# Patient Record
Sex: FEMALE | URBAN - METROPOLITAN AREA
[De-identification: names, ages, dates, MRNs, and addresses within clinical notes are randomized per-mention and may not be internally consistent; named-entity substitution may affect disease eponyms.]

---

## 2018-12-17 ENCOUNTER — OFFICE VISIT (OUTPATIENT)
Dept: URBAN - METROPOLITAN AREA CLINIC 11 | Facility: CLINIC | Age: 58
End: 2018-12-17
Payer: COMMERCIAL

## 2018-12-17 DIAGNOSIS — G35 MULTIPLE SCLEROSIS: ICD-10-CM

## 2018-12-17 PROCEDURE — 92014 COMPRE OPH EXAM EST PT 1/>: CPT | Performed by: OPTOMETRIST

## 2018-12-17 ASSESSMENT — KERATOMETRY
OS: 44.63
OD: 43.38

## 2018-12-17 ASSESSMENT — INTRAOCULAR PRESSURE
OD: 11
OS: 9

## 2018-12-17 NOTE — IMPRESSION/PLAN
Impression: Diagnosis: Multiple sclerosis. Code: G35. Plan: OCT of macula and optic nerve normal ou. Vision loss does not seem to be ocular. No Pathology seen today to explain rapid vision loss. Optic nerves look healthy today, no swelling seen.  Refer to [facility] for vision therapy eval.

## 2018-12-17 NOTE — IMPRESSION/PLAN
Impression: Age-related nuclear cataract, bilateral: H25.13. Plan: Discussed diagnosis in detail with patient. No treatment is required at this time. Will continue to observe condition and or symptoms. Call if [address] worsens.

## 2018-12-17 NOTE — IMPRESSION/PLAN
Impression: Type 2 diabetes mellitus w/o complication: C95.6. Plan: No signs of retinopathy or neovascularization noted. Discussed ocular and systemic benefits of blood sugar control.  RTC 1yr complete exam

## 2019-04-25 ENCOUNTER — OFFICE VISIT (OUTPATIENT)
Dept: URBAN - METROPOLITAN AREA CLINIC 11 | Facility: CLINIC | Age: 59
End: 2019-04-25
Payer: COMMERCIAL

## 2019-04-25 DIAGNOSIS — H00.022 HORDEOLUM INTERNUM RIGHT LOWER EYELID: Primary | ICD-10-CM

## 2019-04-25 PROCEDURE — 92012 INTRM OPH EXAM EST PATIENT: CPT | Performed by: OPTOMETRIST

## 2019-04-25 ASSESSMENT — INTRAOCULAR PRESSURE
OS: 9
OD: 9

## 2019-05-06 ENCOUNTER — OFFICE VISIT (OUTPATIENT)
Dept: URBAN - METROPOLITAN AREA CLINIC 11 | Facility: CLINIC | Age: 59
End: 2019-05-06
Payer: COMMERCIAL

## 2019-05-06 PROCEDURE — 99213 OFFICE O/P EST LOW 20 MIN: CPT | Performed by: OPTOMETRIST

## 2019-05-06 ASSESSMENT — INTRAOCULAR PRESSURE
OD: 11
OS: 11

## 2019-05-06 NOTE — IMPRESSION/PLAN
Impression: Hordeolum internum right lower eyelid: H00.022. Plan: Cont HC and LM BID x 10mins.  x 2wks

## 2020-02-18 ENCOUNTER — OFFICE VISIT (OUTPATIENT)
Dept: URBAN - METROPOLITAN AREA CLINIC 11 | Facility: CLINIC | Age: 60
End: 2020-02-18
Payer: COMMERCIAL

## 2020-02-18 DIAGNOSIS — H53.10 UNSPECIFIED SUBJECTIVE VISUAL DISTURBANCES: ICD-10-CM

## 2020-02-18 DIAGNOSIS — H25.13 AGE-RELATED NUCLEAR CATARACT, BILATERAL: Primary | ICD-10-CM

## 2020-02-18 PROCEDURE — 92014 COMPRE OPH EXAM EST PT 1/>: CPT | Performed by: OPTOMETRIST

## 2020-02-18 ASSESSMENT — INTRAOCULAR PRESSURE
OS: 11
OD: 10

## 2020-02-18 NOTE — IMPRESSION/PLAN
Impression: Diagnosis: Unspecified subjective visual disturbances. Code: H53.10.  Plan: Refer to [facility] for [facility] eval

## 2020-02-18 NOTE — IMPRESSION/PLAN
Impression: Diagnosis: Type 2 diabetes mellitus without complications. Code: E11.9. Plan: Discussed diagnosis in detail with patient.

## 2020-02-18 NOTE — IMPRESSION/PLAN
Impression: Diagnosis: Multiple sclerosis. Code: G35. Plan: No Pathology seen today to explain  vision loss. Optic nerves look healthy today, no swelling seen. Cataract are very early, do not recommend CEIOL at this time. Monitor.

## 2021-08-19 ENCOUNTER — OFFICE VISIT (OUTPATIENT)
Dept: URBAN - METROPOLITAN AREA CLINIC 11 | Facility: CLINIC | Age: 61
End: 2021-08-19
Payer: COMMERCIAL

## 2021-08-19 DIAGNOSIS — E11.9 TYPE 2 DIABETES MELLITUS WITHOUT COMPLICATIONS: Primary | ICD-10-CM

## 2021-08-19 PROCEDURE — 92014 COMPRE OPH EXAM EST PT 1/>: CPT | Performed by: OPTOMETRIST

## 2021-08-19 ASSESSMENT — INTRAOCULAR PRESSURE
OD: 10
OS: 10

## 2021-08-19 NOTE — IMPRESSION/PLAN
Impression: Diagnosis: Type 2 diabetes mellitus without complications. Code: E11.9. Plan: Fundus photos performed today ou. No DME. No signs of neovascularization noted. Discussed ocular and systemic benefits of blood sugar control. Continue blood sugar control.  RTC 1yr complete exam

## 2021-08-19 NOTE — IMPRESSION/PLAN
Impression: Diagnosis: Multiple sclerosis. Code: G35. Plan: Hx of head trauma which may contribute to decreased VA. No treatment is required at this time. Will continue to observe condition and or symptoms. Call if [address] worsens.

## 2024-07-18 ENCOUNTER — OFFICE VISIT (OUTPATIENT)
Dept: URBAN - METROPOLITAN AREA CLINIC 28 | Facility: CLINIC | Age: 64
End: 2024-07-18
Payer: COMMERCIAL

## 2024-07-18 DIAGNOSIS — R51.9 HEADACHE: ICD-10-CM

## 2024-07-18 DIAGNOSIS — H25.813 COMBINED FORMS OF AGE-RELATED CATARACT, BILATERAL: ICD-10-CM

## 2024-07-18 DIAGNOSIS — G35 MULTIPLE SCLEROSIS: ICD-10-CM

## 2024-07-18 DIAGNOSIS — E11.9 TYPE 2 DIABETES MELLITUS WITHOUT COMPLICATIONS: Primary | ICD-10-CM

## 2024-07-18 PROCEDURE — 92014 COMPRE OPH EXAM EST PT 1/>: CPT | Performed by: OPTOMETRIST

## 2024-07-18 PROCEDURE — 92250 FUNDUS PHOTOGRAPHY W/I&R: CPT | Performed by: OPTOMETRIST

## 2024-07-18 PROCEDURE — 92133 CPTRZD OPH DX IMG PST SGM ON: CPT | Performed by: OPTOMETRIST

## 2024-07-18 RX ORDER — PROPYLENE GLYCOL 0.06 MG/ML
0.6 % EMULSION OPHTHALMIC
Qty: 10 | Refills: 2 | Status: ACTIVE
Start: 2024-07-18

## 2024-07-18 ASSESSMENT — INTRAOCULAR PRESSURE
OS: 12
OD: 11

## 2025-07-30 ENCOUNTER — OFFICE VISIT (OUTPATIENT)
Dept: URBAN - METROPOLITAN AREA CLINIC 28 | Facility: CLINIC | Age: 65
End: 2025-07-30
Payer: COMMERCIAL

## 2025-07-30 DIAGNOSIS — E11.9 TYPE 2 DIABETES MELLITUS WITHOUT COMPLICATIONS: Primary | ICD-10-CM

## 2025-07-30 DIAGNOSIS — H25.813 COMBINED FORMS OF AGE-RELATED CATARACT, BILATERAL: ICD-10-CM

## 2025-07-30 DIAGNOSIS — G35 MULTIPLE SCLEROSIS: ICD-10-CM

## 2025-07-30 PROCEDURE — 92133 CPTRZD OPH DX IMG PST SGM ON: CPT | Performed by: OPTOMETRIST

## 2025-07-30 PROCEDURE — 92014 COMPRE OPH EXAM EST PT 1/>: CPT | Performed by: OPTOMETRIST

## 2025-07-30 PROCEDURE — 92250 FUNDUS PHOTOGRAPHY W/I&R: CPT | Performed by: OPTOMETRIST

## 2025-07-30 ASSESSMENT — INTRAOCULAR PRESSURE
OD: 10
OS: 10